# Patient Record
Sex: FEMALE | Race: WHITE | NOT HISPANIC OR LATINO | Employment: STUDENT
[De-identification: names, ages, dates, MRNs, and addresses within clinical notes are randomized per-mention and may not be internally consistent; named-entity substitution may affect disease eponyms.]

---
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
## 2023-01-07 NOTE — TELEPHONE ENCOUNTER
"  Admission Medication History     The home medication history was taken by Deanna Marie CPhT.    Medication history obtained from, [facility] Verified    You may go to "Admission" then "Reconcile Home Medications" tabs to review and/or act upon these items.     The home medication list has been updated by the Pharmacy department.   Please read ALL comments highlighted in yellow.   Please address this information as you see fit.    Feel free to contact us if you have any questions or require assistance.            Deanna Marie CPhT.  Ext [phone]             .        " Per pt's mother, states pt's brother was seen in urgent care in [place] and tested positive for influenza. Pt's mom drove with brother for 5 hours back home.  Family is going out of town this coming weekend and mom would like them all to be on preventati

## 2023-01-10 ENCOUNTER — APPOINTMENT (OUTPATIENT)
Dept: PEDIATRIC CARDIOLOGY | Age: 18
End: 2023-01-10
Attending: INTERNAL MEDICINE

## 2023-01-10 ENCOUNTER — APPOINTMENT (OUTPATIENT)
Dept: PEDIATRIC CARDIOLOGY | Age: 18
End: 2023-01-10

## 2023-05-10 ENCOUNTER — TELEPHONE (OUTPATIENT)
Dept: PEDIATRIC CARDIOLOGY | Age: 18
End: 2023-05-10

## 2023-07-14 ENCOUNTER — OFFICE VISIT (OUTPATIENT)
Dept: PEDIATRIC CARDIOLOGY | Age: 18
End: 2023-07-14

## 2023-07-14 ENCOUNTER — ANCILLARY PROCEDURE (OUTPATIENT)
Dept: PEDIATRIC CARDIOLOGY | Age: 18
End: 2023-07-14
Attending: INTERNAL MEDICINE

## 2023-07-14 VITALS
HEART RATE: 76 BPM | HEIGHT: 63 IN | DIASTOLIC BLOOD PRESSURE: 76 MMHG | WEIGHT: 119.05 LBS | OXYGEN SATURATION: 92 % | SYSTOLIC BLOOD PRESSURE: 126 MMHG | BODY MASS INDEX: 21.09 KG/M2

## 2023-07-14 DIAGNOSIS — Z98.890 STATUS POST FONTAN PROCEDURE: ICD-10-CM

## 2023-07-14 DIAGNOSIS — K76.9 LIVER DISEASE AFTER FONTAN PROCEDURE: ICD-10-CM

## 2023-07-14 DIAGNOSIS — Q22.4 TRICUSPID ATRESIA WITH NORMAL GREAT ARTERIES: Primary | ICD-10-CM

## 2023-07-14 DIAGNOSIS — Q22.4 TRICUSPID ATRESIA WITH NORMAL GREAT ARTERIES: ICD-10-CM

## 2023-07-14 DIAGNOSIS — T81.89XA LIVER DISEASE AFTER FONTAN PROCEDURE: ICD-10-CM

## 2023-07-14 DIAGNOSIS — Z71.87 COUNSELING FOR TRANSITION FROM PEDIATRIC TO ADULT MODEL OF CARE: ICD-10-CM

## 2023-07-14 LAB
BSA FOR PED ECHO PROCEDURE: 1.55 M2
EJECTION FRACTION: 57 %
FRACTIONAL SHORTENING: 38 %
LEFT VENTRICLE EJECTION FRACTION BY TEICHOLZ 2D (%): 66 %
LEFT VENTRICULAR POSTERIOR WALL IN END DIASTOLE (LVPW): 0.69 CM (ref 0.47–0.89)
LV SHORT-AXIS END-DIASTOLIC ENDOCARDIAL DIAMETER: 4.91 CM (ref 3.94–5.54)
LV SHORT-AXIS END-DIASTOLIC SEPTAL THICKNESS: 0.86 CM (ref 0.48–0.9)
LV SHORT-AXIS END-SYSTOLIC ENDOCARDIAL DIAMETER: 3.06 CM
LV THICKNESS:DIMENSION RATIO: 0.14 CM (ref 0.09–0.21)
Z SCORE OF LEFT VENTRICULAR POSTERIOR WALL IN END DIASTOLE: 0.1 CM
Z SCORE OF LV SHORT-AXIS END-DIASTOLIC ENDOCARDIAL DIAMETER: 0.4 CM
Z SCORE OF LV SHORT-AXIS END-DIASTOLIC SEPTAL THICKNESS: 1.6 CM
Z SCORE OF LV THICKNESS:DIMENSION RATIO: -0.3

## 2023-07-14 PROCEDURE — 99215 OFFICE O/P EST HI 40 MIN: CPT | Performed by: PEDIATRICS

## 2023-07-14 PROCEDURE — 93320 DOPPLER ECHO COMPLETE: CPT | Performed by: PEDIATRICS

## 2023-07-14 PROCEDURE — 93303 ECHO TRANSTHORACIC: CPT | Performed by: PEDIATRICS

## 2023-07-14 PROCEDURE — 93325 DOPPLER ECHO COLOR FLOW MAPG: CPT | Performed by: PEDIATRICS

## 2023-07-14 RX ORDER — SERTRALINE HYDROCHLORIDE 25 MG/1
25 TABLET, FILM COATED ORAL DAILY
COMMUNITY

## 2023-07-17 DIAGNOSIS — Q22.4 TRICUSPID ATRESIA WITH NORMAL GREAT ARTERIES: ICD-10-CM

## 2023-07-17 DIAGNOSIS — K76.9 LIVER DISEASE AFTER FONTAN PROCEDURE: ICD-10-CM

## 2023-07-17 DIAGNOSIS — T81.89XA LIVER DISEASE AFTER FONTAN PROCEDURE: ICD-10-CM

## 2023-07-17 DIAGNOSIS — Z98.890 STATUS POST FONTAN PROCEDURE: Primary | ICD-10-CM

## 2023-07-26 ENCOUNTER — NURSE TRIAGE (OUTPATIENT)
Dept: TELEHEALTH | Age: 18
End: 2023-07-26

## 2023-07-28 ENCOUNTER — E-ADVICE (OUTPATIENT)
Dept: PEDIATRIC CARDIOLOGY | Age: 18
End: 2023-07-28

## 2023-07-28 ENCOUNTER — TELEPHONE (OUTPATIENT)
Dept: CARDIOLOGY | Age: 18
End: 2023-07-28

## 2023-07-31 ENCOUNTER — TELEPHONE (OUTPATIENT)
Dept: CARDIOLOGY | Age: 18
End: 2023-07-31

## 2023-08-07 ENCOUNTER — HOSPITAL ENCOUNTER (OUTPATIENT)
Dept: PEDIATRIC CARDIOLOGY | Age: 18
Discharge: HOME | End: 2023-08-07
Attending: PEDIATRICS

## 2023-08-07 ENCOUNTER — HOSPITAL ENCOUNTER (OUTPATIENT)
Dept: CT IMAGING | Age: 18
Discharge: HOME | End: 2023-08-07
Attending: PEDIATRICS

## 2023-08-07 ENCOUNTER — LAB SERVICES (OUTPATIENT)
Dept: LAB | Age: 18
End: 2023-08-07

## 2023-08-07 DIAGNOSIS — Z98.890 STATUS POST FONTAN PROCEDURE: ICD-10-CM

## 2023-08-07 DIAGNOSIS — Q22.4 TRICUSPID ATRESIA WITH NORMAL GREAT ARTERIES: ICD-10-CM

## 2023-08-07 DIAGNOSIS — T81.89XA LIVER DISEASE AFTER FONTAN PROCEDURE: ICD-10-CM

## 2023-08-07 DIAGNOSIS — K76.9 LIVER DISEASE AFTER FONTAN PROCEDURE: ICD-10-CM

## 2023-08-07 LAB
25(OH)D3+25(OH)D2 SERPL-MCNC: 26.3 NG/ML (ref 30–100)
ALBUMIN SERPL-MCNC: 4.8 G/DL (ref 3.6–5.1)
ALBUMIN/GLOB SERPL: 1.3 {RATIO} (ref 1–2.4)
ALP SERPL-CCNC: 80 UNITS/L (ref 42–110)
ALT SERPL-CCNC: 32 UNITS/L
ANION GAP SERPL CALC-SCNC: 11 MMOL/L (ref 7–19)
AST SERPL-CCNC: 15 UNITS/L
BASOPHILS # BLD: 0 K/MCL (ref 0–0.3)
BASOPHILS NFR BLD: 0 %
BILIRUB SERPL-MCNC: 0.6 MG/DL (ref 0.2–1)
BUN SERPL-MCNC: 10 MG/DL (ref 6–20)
BUN/CREAT SERPL: 14 (ref 7–25)
CALCIUM SERPL-MCNC: 9.8 MG/DL (ref 8.4–10.2)
CHLORIDE SERPL-SCNC: 108 MMOL/L (ref 97–110)
CO2 SERPL-SCNC: 23 MMOL/L (ref 21–32)
CREAT SERPL-MCNC: 0.72 MG/DL (ref 0.51–0.95)
DEPRECATED RDW RBC: 42 FL (ref 39–50)
EOSINOPHIL # BLD: 0.1 K/MCL (ref 0–0.5)
EOSINOPHIL NFR BLD: 1 %
ERYTHROCYTE [DISTWIDTH] IN BLOOD: 12.2 % (ref 11–15)
FASTING DURATION TIME PATIENT: ABNORMAL H
FERRITIN SERPL-MCNC: 30 NG/ML (ref 8–252)
GFR SERPLBLD BASED ON 1.73 SQ M-ARVRAT: >90 ML/MIN
GGT SERPL-CCNC: 77 UNITS/L (ref 5–55)
GLOBULIN SER-MCNC: 3.6 G/DL (ref 2–4)
GLUCOSE SERPL-MCNC: 82 MG/DL (ref 70–99)
HBA1C MFR BLD: 5 % (ref 4.5–5.6)
HCT VFR BLD CALC: 43.9 % (ref 36–46.5)
HGB BLD-MCNC: 15.4 G/DL (ref 12–15.5)
IMM GRANULOCYTES # BLD AUTO: 0 K/MCL (ref 0–0.2)
IMM GRANULOCYTES # BLD: 0 %
INR PPP: 1.1
IRON SATN MFR SERPL: 12 % (ref 15–45)
IRON SERPL-MCNC: 48 MCG/DL (ref 50–170)
LYMPHOCYTES # BLD: 2 K/MCL (ref 1.2–5.2)
LYMPHOCYTES NFR BLD: 27 %
MAGNESIUM SERPL-MCNC: 2.2 MG/DL (ref 1.7–2.4)
MCH RBC QN AUTO: 32.6 PG (ref 26–34)
MCHC RBC AUTO-ENTMCNC: 35.1 G/DL (ref 32–36.5)
MCV RBC AUTO: 92.8 FL (ref 78–100)
MONOCYTES # BLD: 0.6 K/MCL (ref 0.3–0.9)
MONOCYTES NFR BLD: 8 %
NEUTROPHILS # BLD: 4.5 K/MCL (ref 1.8–8)
NEUTROPHILS NFR BLD: 64 %
NRBC BLD MANUAL-RTO: 0 /100 WBC
NT-PROBNP SERPL-MCNC: 102 PG/ML
PLATELET # BLD AUTO: 186 K/MCL (ref 140–450)
POTASSIUM SERPL-SCNC: 4.4 MMOL/L (ref 3.4–5.1)
PROT SERPL-MCNC: 8.4 G/DL (ref 6.4–8.2)
PROTHROMBIN TIME: 11.3 SEC (ref 9.7–11.8)
RBC # BLD: 4.73 MIL/MCL (ref 4–5.2)
SODIUM SERPL-SCNC: 138 MMOL/L (ref 135–145)
T3FREE SERPL-MCNC: 2.9 PG/ML (ref 2.9–4.7)
T4 FREE SERPL-MCNC: 1.1 NG/DL (ref 0.8–1.3)
TIBC SERPL-MCNC: 404 MCG/DL (ref 250–450)
TSH SERPL-ACNC: 2.76 MCUNITS/ML (ref 0.46–4.13)
WBC # BLD: 7.2 K/MCL (ref 4.2–11)

## 2023-08-07 PROCEDURE — 94621 CARDIOPULM EXERCISE TESTING: CPT

## 2023-08-07 PROCEDURE — 83540 ASSAY OF IRON: CPT | Performed by: INTERNAL MEDICINE

## 2023-08-07 PROCEDURE — 94621 CARDIOPULM EXERCISE TESTING: CPT | Performed by: PEDIATRICS

## 2023-08-07 PROCEDURE — 83880 ASSAY OF NATRIURETIC PEPTIDE: CPT | Performed by: INTERNAL MEDICINE

## 2023-08-07 PROCEDURE — G1004 CDSM NDSC: HCPCS

## 2023-08-07 PROCEDURE — 10002805 HB CONTRAST AGENT: Performed by: PEDIATRICS

## 2023-08-07 PROCEDURE — 82728 ASSAY OF FERRITIN: CPT | Performed by: INTERNAL MEDICINE

## 2023-08-07 PROCEDURE — 82306 VITAMIN D 25 HYDROXY: CPT | Performed by: INTERNAL MEDICINE

## 2023-08-07 PROCEDURE — 83036 HEMOGLOBIN GLYCOSYLATED A1C: CPT | Performed by: INTERNAL MEDICINE

## 2023-08-07 PROCEDURE — 82105 ALPHA-FETOPROTEIN SERUM: CPT | Performed by: INTERNAL MEDICINE

## 2023-08-07 PROCEDURE — 82977 ASSAY OF GGT: CPT | Performed by: INTERNAL MEDICINE

## 2023-08-07 PROCEDURE — 84439 ASSAY OF FREE THYROXINE: CPT | Performed by: INTERNAL MEDICINE

## 2023-08-07 PROCEDURE — 36415 COLL VENOUS BLD VENIPUNCTURE: CPT | Performed by: PEDIATRICS

## 2023-08-07 PROCEDURE — 80050 GENERAL HEALTH PANEL: CPT | Performed by: INTERNAL MEDICINE

## 2023-08-07 PROCEDURE — 84481 FREE ASSAY (FT-3): CPT | Performed by: INTERNAL MEDICINE

## 2023-08-07 PROCEDURE — 85610 PROTHROMBIN TIME: CPT | Performed by: INTERNAL MEDICINE

## 2023-08-07 PROCEDURE — 83735 ASSAY OF MAGNESIUM: CPT | Performed by: INTERNAL MEDICINE

## 2023-08-07 PROCEDURE — 83550 IRON BINDING TEST: CPT | Performed by: INTERNAL MEDICINE

## 2023-08-07 PROCEDURE — 74170 CT ABD WO CNTRST FLWD CNTRST: CPT

## 2023-08-07 RX ADMIN — IOHEXOL 75 ML: 350 INJECTION, SOLUTION INTRAVENOUS at 13:03

## 2023-08-08 LAB — AFP-TM SERPL-MCNC: <3 NG/ML

## 2023-08-18 LAB
PEAK HR PREDICTED: 201 BPM
STRESS TARGET HR: 201 BPM

## 2023-08-22 ENCOUNTER — E-ADVICE (OUTPATIENT)
Dept: PEDIATRIC CARDIOLOGY | Age: 18
End: 2023-08-22

## 2024-01-12 ENCOUNTER — APPOINTMENT (OUTPATIENT)
Dept: PEDIATRIC CARDIOLOGY | Age: 19
End: 2024-01-12

## 2024-01-12 VITALS
DIASTOLIC BLOOD PRESSURE: 56 MMHG | HEIGHT: 63 IN | WEIGHT: 115.41 LBS | HEART RATE: 58 BPM | OXYGEN SATURATION: 88 % | BODY MASS INDEX: 20.45 KG/M2 | SYSTOLIC BLOOD PRESSURE: 97 MMHG | TEMPERATURE: 98.4 F

## 2024-01-12 DIAGNOSIS — Z98.890 STATUS POST FONTAN PROCEDURE: Primary | ICD-10-CM

## 2024-01-12 DIAGNOSIS — Q22.4 TRICUSPID ATRESIA WITH NORMAL GREAT ARTERIES: ICD-10-CM

## 2024-01-12 PROCEDURE — 99215 OFFICE O/P EST HI 40 MIN: CPT | Performed by: PEDIATRICS

## 2024-01-12 RX ORDER — GUANFACINE 3 MG/1
3 TABLET, EXTENDED RELEASE ORAL DAILY
COMMUNITY
Start: 2023-12-19

## 2024-01-12 RX ORDER — SILDENAFIL CITRATE 20 MG/1
20 TABLET ORAL 2 TIMES DAILY
Qty: 90 TABLET | Refills: 3 | Status: SHIPPED | OUTPATIENT
Start: 2024-01-12

## 2024-03-07 ENCOUNTER — TELEPHONE (OUTPATIENT)
Dept: FAMILY MEDICINE CLINIC | Facility: CLINIC | Age: 19
End: 2024-03-07

## 2024-03-07 NOTE — TELEPHONE ENCOUNTER
Pt requesting refill on spironolactone 25 MG to [facility].  LOV 1/22.  Pt sched appt for next week.  Asking for refill now.  Pls advise

## 2024-03-07 NOTE — TELEPHONE ENCOUNTER
Pt last seen 1/4/22, per office notes:  Spironolactone, helped with skin in the past and wondering about restarting. Didn't add spironolactone as she runs low on BP,     Will await appt to restart med.

## 2024-03-12 ENCOUNTER — TELEMEDICINE (OUTPATIENT)
Dept: FAMILY MEDICINE CLINIC | Facility: CLINIC | Age: 19
End: 2024-03-12
Payer: COMMERCIAL

## 2024-03-12 DIAGNOSIS — L70.0 ACNE VULGARIS: Primary | ICD-10-CM

## 2024-03-12 PROCEDURE — 99213 OFFICE O/P EST LOW 20 MIN: CPT | Performed by: INTERNAL MEDICINE

## 2024-03-12 RX ORDER — FLUOXETINE HYDROCHLORIDE 40 MG/1
40 CAPSULE ORAL DAILY
COMMUNITY
Start: 2024-03-12

## 2024-03-12 RX ORDER — FLUOXETINE 10 MG/1
10 CAPSULE ORAL DAILY
COMMUNITY
Start: 2024-03-12

## 2024-03-12 RX ORDER — SILDENAFIL CITRATE 20 MG/1
20 TABLET ORAL 2 TIMES DAILY
COMMUNITY
Start: 2024-03-12

## 2024-03-12 RX ORDER — GUANFACINE 3 MG/1
3 TABLET, EXTENDED RELEASE ORAL DAILY
COMMUNITY
Start: 2022-01-29

## 2024-03-12 RX ORDER — SPIRONOLACTONE 25 MG/1
25 TABLET ORAL DAILY
Qty: 90 TABLET | Refills: 3 | Status: SHIPPED | OUTPATIENT
Start: 2024-03-12 | End: 2024-03-12 | Stop reason: CLARIF

## 2024-03-12 RX ORDER — SPIRONOLACTONE 50 MG/1
50 TABLET, FILM COATED ORAL DAILY
Qty: 90 TABLET | Refills: 3 | Status: SHIPPED | OUTPATIENT
Start: 2024-03-12

## 2024-03-12 NOTE — PROGRESS NOTES
Video Visit  Leslie Patel is a 18 year old female.  No chief complaint on file.        HPI:   Pt requests a video visit due to the Covid pandemic to discuss acne follow up.  Taking spironolactone for skin, seeing improvement. No SEs. Previously on 25mg and now on 50mg. SE maybe makes her a little tired.  Sees therapist and psychiatrist routinely. Sertraline added.    Sees Cardiology, sildenafil added BID. Baby ASA daily.        Current Outpatient Medications   Medication Sig Dispense Refill    guanFACINE HCl ER 3 MG Oral Tablet 24 Hr Take 1 tablet (3 mg total) by mouth daily.      spironolactone 25 MG Oral Tab Take 1 tablet (25 mg total) by mouth daily. 90 tablet 3    FLUoxetine 10 MG Oral Cap Take 1 capsule (10 mg total) by mouth daily. Take with fluoxetine 40 to equal 50mg daily      FLUoxetine HCl 40 MG Oral Cap Take 1 capsule (40 mg total) by mouth daily. Take with fluoxetine 10 to equal 50mg daily      sertraline 50 MG Oral Tab Take 1 tablet (50 mg total) by mouth daily.      sildenafil 20 MG Oral Tab Take 1 tablet (20 mg total) by mouth in the morning and 1 tablet (20 mg total) before bedtime. Rx'd by Cardiology.      aspirin 81 MG Oral Tab Take 81 mg by mouth daily.        Past Medical History:   Diagnosis Date    Strep pharyngitis 1/5/2013    Unspecified congenital anomaly of heart (HCC)     Ventricular septal defect (HCC)       Past Surgical History:   Procedure Laterality Date    Other surgical history  02/2004    heart surgery- 2004, superior vena cava    Other surgical history  06/2005    inferior vena cava      Social History:  Social History     Socioeconomic History    Marital status: Single   Tobacco Use    Smoking status: Never    Smokeless tobacco: Never   Substance and Sexual Activity    Alcohol use: No    Drug use: No   Other Topics Concern    Exercise Yes   Social History Narrative    ** Merged History Encounter **             REVIEW OF SYSTEMS:   GENERAL HEALTH: Denies fevers, chills or  myalgias  HEENT: Denies ear pain, nasal congestion, sinus pain, or sore throat  SKIN: Denies rash  LUNGS: Denies shortness of breath, wheezing, or cough  CV: Denies chest pain or palpitations; denies lightheadedness  GI: Denies abdominal pain, denies N/V/D  MS: Denies back, neck or joint pain  NEURO: Denies headaches  ALLERGY/ASTHMA: Denies asthma and environmental allergies       EXAM:   GENERAL: well developed, well nourished, NAD.  SKIN: No rash visible  HEENT: AT/NC. Normal lips, gums and teeth; no hyponasal tone on video visit  LUNGS: Speaking in complete sentences comfortably without increased work of breathing; no cough during visit  PSYCH: Normal mood and affect, A&Ox3, affect is consistent with mood      ASSESSMENT AND PLAN:   Diagnoses and all orders for this visit:    Acne vulgaris    Other orders  -     spironolactone 25 MG Oral Tab; Take 1 tablet (25 mg total) by mouth daily.            The patient indicates understanding of these issues and agrees to the plan.    Follow up 1 year    Duration of visit: 15 min      Leslie Patel understands a video evaluation is not a substitute for face-to-face examination or emergency care. Patient advised to go to ER or call 911 for worsening symptoms or acute distress.   Please note that the following visit was completed using two-way, real-time interactive video communication. This has been done in good [place] to provide continuity of care in the best interest of the provider-patient relationship, due to the ongoing public health crisis/national emergency and because of restrictions of visitation. There are limitations of this visit as limited physical exam could be performed. Every conscious effort was taken to allow for sufficient and adequate time. This billing was spent on reviewing labs, medications, radiology tests and decision making. Appropriate medical decision-making and tests are ordered as detailed in the plan of care above.

## 2024-04-02 ENCOUNTER — E-ADVICE (OUTPATIENT)
Dept: PEDIATRIC CARDIOLOGY | Age: 19
End: 2024-04-02

## 2024-05-21 ENCOUNTER — TELEPHONE (OUTPATIENT)
Dept: PEDIATRIC CARDIOLOGY | Age: 19
End: 2024-05-21

## 2024-05-21 DIAGNOSIS — I34.0 NONRHEUMATIC MITRAL VALVE REGURGITATION: ICD-10-CM

## 2024-05-21 DIAGNOSIS — Z98.890 STATUS POST FONTAN PROCEDURE: ICD-10-CM

## 2024-05-21 DIAGNOSIS — Q22.4 TRICUSPID ATRESIA WITH NORMAL GREAT ARTERIES (CMD): Primary | ICD-10-CM

## 2024-05-23 ENCOUNTER — LAB SERVICES (OUTPATIENT)
Dept: LAB | Age: 19
End: 2024-05-23

## 2024-05-23 ENCOUNTER — ANCILLARY PROCEDURE (OUTPATIENT)
Dept: PEDIATRIC CARDIOLOGY | Age: 19
End: 2024-05-23
Attending: PEDIATRICS

## 2024-05-23 ENCOUNTER — OFFICE VISIT (OUTPATIENT)
Dept: PEDIATRIC CARDIOLOGY | Age: 19
End: 2024-05-23

## 2024-05-23 VITALS
HEART RATE: 59 BPM | DIASTOLIC BLOOD PRESSURE: 56 MMHG | SYSTOLIC BLOOD PRESSURE: 111 MMHG | BODY MASS INDEX: 18.4 KG/M2 | OXYGEN SATURATION: 93 % | HEIGHT: 64 IN | WEIGHT: 107.81 LBS

## 2024-05-23 DIAGNOSIS — Q22.4 TRICUSPID ATRESIA WITH NORMAL GREAT ARTERIES (CMD): ICD-10-CM

## 2024-05-23 DIAGNOSIS — Z98.890 STATUS POST FONTAN PROCEDURE: ICD-10-CM

## 2024-05-23 DIAGNOSIS — Q22.4 TRICUSPID ATRESIA WITH NORMAL GREAT ARTERIES (CMD): Primary | ICD-10-CM

## 2024-05-23 DIAGNOSIS — I34.0 NONRHEUMATIC MITRAL VALVE REGURGITATION: ICD-10-CM

## 2024-05-23 LAB
ALBUMIN SERPL-MCNC: 5.2 G/DL (ref 3.6–5.1)
ALBUMIN/GLOB SERPL: 1.5 {RATIO} (ref 1–2.4)
ALP SERPL-CCNC: 63 UNITS/L (ref 45–117)
ALT SERPL-CCNC: 25 UNITS/L
ANION GAP SERPL CALC-SCNC: 9 MMOL/L (ref 7–19)
AST SERPL-CCNC: 12 UNITS/L
BASOPHILS # BLD: 0 K/MCL (ref 0–0.3)
BASOPHILS NFR BLD: 1 %
BILIRUB SERPL-MCNC: 1.1 MG/DL (ref 0.2–1)
BSA FOR PED ECHO PROCEDURE: 1.48 M2
BUN SERPL-MCNC: 10 MG/DL (ref 6–20)
BUN/CREAT SERPL: 13 (ref 7–25)
CALCIUM SERPL-MCNC: 10.3 MG/DL (ref 8.4–10.2)
CHLORIDE SERPL-SCNC: 110 MMOL/L (ref 97–110)
CO2 SERPL-SCNC: 23 MMOL/L (ref 21–32)
CREAT SERPL-MCNC: 0.76 MG/DL (ref 0.51–0.95)
DEPRECATED RDW RBC: 41.4 FL (ref 39–50)
EGFRCR SERPLBLD CKD-EPI 2021: >90 ML/MIN/{1.73_M2}
EOSINOPHIL # BLD: 0.1 K/MCL (ref 0–0.5)
EOSINOPHIL NFR BLD: 1 %
ERYTHROCYTE [DISTWIDTH] IN BLOOD: 11.9 % (ref 11–15)
FASTING DURATION TIME PATIENT: ABNORMAL H
FERRITIN SERPL-MCNC: 153 NG/ML (ref 8–252)
GGT SERPL-CCNC: 56 UNITS/L (ref 5–55)
GLOBULIN SER-MCNC: 3.4 G/DL (ref 2–4)
GLUCOSE SERPL-MCNC: 87 MG/DL (ref 70–99)
HCT VFR BLD CALC: 44.7 % (ref 36–46.5)
HGB BLD-MCNC: 15.6 G/DL (ref 12–15.5)
IMM GRANULOCYTES # BLD AUTO: 0 K/MCL (ref 0–0.2)
IMM GRANULOCYTES # BLD: 0 %
INR PPP: 1.2
IRON SATN MFR SERPL: 43 % (ref 15–45)
IRON SERPL-MCNC: 152 MCG/DL (ref 50–170)
LEFT VENTRICLE LONGITUDINAL STRAIN BY ENDOCARDIAL GLS A2C (%): -19 %
LEFT VENTRICLE LONGITUDINAL STRAIN BY ENDOCARDIAL GLS A3C (%): -18.2 %
LEFT VENTRICLE LONGITUDINAL STRAIN BY ENDOCARDIAL GLS A4C (%): -12.6 %
LEFT VENTRICLE LONGITUDINAL STRAIN BY ENDOCARDIAL GLS AVERAGE (%): -19.6 %
LYMPHOCYTES # BLD: 2.3 K/MCL (ref 1.2–5.2)
LYMPHOCYTES NFR BLD: 35 %
MAGNESIUM SERPL-MCNC: 2.2 MG/DL (ref 1.7–2.4)
MCH RBC QN AUTO: 33.5 PG (ref 26–34)
MCHC RBC AUTO-ENTMCNC: 34.9 G/DL (ref 32–36.5)
MCV RBC AUTO: 95.9 FL (ref 78–100)
MONOCYTES # BLD: 0.6 K/MCL (ref 0.3–0.9)
MONOCYTES NFR BLD: 9 %
NEUTROPHILS # BLD: 3.6 K/MCL (ref 1.8–8)
NEUTROPHILS NFR BLD: 54 %
NRBC BLD MANUAL-RTO: 0 /100 WBC
PA AA PRP-ACNC: 558 ARU
PLATELET # BLD AUTO: 157 K/MCL (ref 140–450)
POTASSIUM SERPL-SCNC: 3.8 MMOL/L (ref 3.4–5.1)
PROT SERPL-MCNC: 8.6 G/DL (ref 6.4–8.2)
PROTHROMBIN TIME: 12.4 SEC (ref 9.7–11.8)
RBC # BLD: 4.66 MIL/MCL (ref 4–5.2)
SODIUM SERPL-SCNC: 138 MMOL/L (ref 135–145)
T3FREE SERPL-MCNC: 3 PG/ML (ref 2.9–4.7)
T4 FREE SERPL-MCNC: 1.2 NG/DL (ref 0.8–1.3)
TIBC SERPL-MCNC: 355 MCG/DL (ref 250–450)
TSH SERPL-ACNC: 2.99 MCUNITS/ML (ref 0.46–4.13)
WBC # BLD: 6.6 K/MCL (ref 4.2–11)

## 2024-05-23 PROCEDURE — 83550 IRON BINDING TEST: CPT | Performed by: INTERNAL MEDICINE

## 2024-05-23 PROCEDURE — 93320 DOPPLER ECHO COMPLETE: CPT | Performed by: PEDIATRICS

## 2024-05-23 PROCEDURE — 82306 VITAMIN D 25 HYDROXY: CPT | Performed by: CLINICAL MEDICAL LABORATORY

## 2024-05-23 PROCEDURE — 85610 PROTHROMBIN TIME: CPT | Performed by: INTERNAL MEDICINE

## 2024-05-23 PROCEDURE — 82728 ASSAY OF FERRITIN: CPT | Performed by: INTERNAL MEDICINE

## 2024-05-23 PROCEDURE — 99215 OFFICE O/P EST HI 40 MIN: CPT | Performed by: PEDIATRICS

## 2024-05-23 PROCEDURE — 82105 ALPHA-FETOPROTEIN SERUM: CPT | Performed by: CLINICAL MEDICAL LABORATORY

## 2024-05-23 PROCEDURE — 93356 MYOCRD STRAIN IMG SPCKL TRCK: CPT | Performed by: PEDIATRICS

## 2024-05-23 PROCEDURE — 83540 ASSAY OF IRON: CPT | Performed by: INTERNAL MEDICINE

## 2024-05-23 PROCEDURE — 84481 FREE ASSAY (FT-3): CPT | Performed by: INTERNAL MEDICINE

## 2024-05-23 PROCEDURE — 36415 COLL VENOUS BLD VENIPUNCTURE: CPT | Performed by: PEDIATRICS

## 2024-05-23 PROCEDURE — 80050 GENERAL HEALTH PANEL: CPT | Performed by: INTERNAL MEDICINE

## 2024-05-23 PROCEDURE — 85576 BLOOD PLATELET AGGREGATION: CPT | Performed by: INTERNAL MEDICINE

## 2024-05-23 PROCEDURE — 82977 ASSAY OF GGT: CPT | Performed by: INTERNAL MEDICINE

## 2024-05-23 PROCEDURE — 84439 ASSAY OF FREE THYROXINE: CPT | Performed by: INTERNAL MEDICINE

## 2024-05-23 PROCEDURE — 83735 ASSAY OF MAGNESIUM: CPT | Performed by: INTERNAL MEDICINE

## 2024-05-23 PROCEDURE — 93303 ECHO TRANSTHORACIC: CPT | Performed by: PEDIATRICS

## 2024-05-23 PROCEDURE — 93325 DOPPLER ECHO COLOR FLOW MAPG: CPT | Performed by: PEDIATRICS

## 2024-05-24 ENCOUNTER — E-ADVICE (OUTPATIENT)
Dept: PEDIATRIC CARDIOLOGY | Age: 19
End: 2024-05-24

## 2024-05-24 LAB
25(OH)D3+25(OH)D2 SERPL-MCNC: 81.1 NG/ML (ref 30–100)
AFP-TM SERPL-MCNC: <3 NG/ML

## 2024-05-24 RX ORDER — ASPIRIN 325 MG
325 TABLET, DELAYED RELEASE (ENTERIC COATED) ORAL DAILY
Status: SHIPPED | COMMUNITY
Start: 2024-05-24

## 2024-06-14 PROCEDURE — 93244 EXT ECG>48HR<7D REV&INTERPJ: CPT | Performed by: PEDIATRICS

## 2024-06-20 ENCOUNTER — TELEPHONE (OUTPATIENT)
Dept: CARDIOLOGY | Age: 19
End: 2024-06-20

## 2024-06-28 ENCOUNTER — APPOINTMENT (OUTPATIENT)
Dept: PEDIATRIC CARDIOLOGY | Age: 19
End: 2024-06-28

## 2024-06-28 ENCOUNTER — APPOINTMENT (OUTPATIENT)
Dept: PEDIATRIC CARDIOLOGY | Age: 19
End: 2024-06-28
Attending: PEDIATRICS

## 2024-07-02 ENCOUNTER — TELEPHONE (OUTPATIENT)
Dept: PEDIATRIC CARDIOLOGY | Age: 19
End: 2024-07-02

## 2024-07-02 ENCOUNTER — E-ADVICE (OUTPATIENT)
Dept: PEDIATRIC CARDIOLOGY | Age: 19
End: 2024-07-02

## 2024-12-27 ENCOUNTER — APPOINTMENT (OUTPATIENT)
Dept: PEDIATRIC CARDIOLOGY | Age: 19
End: 2024-12-27

## 2024-12-27 ENCOUNTER — LAB SERVICES (OUTPATIENT)
Dept: LAB | Age: 19
End: 2024-12-27

## 2024-12-27 VITALS
BODY MASS INDEX: 21.27 KG/M2 | HEIGHT: 63 IN | HEART RATE: 88 BPM | SYSTOLIC BLOOD PRESSURE: 127 MMHG | OXYGEN SATURATION: 93 % | WEIGHT: 120.04 LBS | DIASTOLIC BLOOD PRESSURE: 86 MMHG

## 2024-12-27 DIAGNOSIS — Q22.4 TRICUSPID ATRESIA WITH NORMAL GREAT ARTERIES (CMD): ICD-10-CM

## 2024-12-27 DIAGNOSIS — Q22.4 TRICUSPID ATRESIA WITH NORMAL GREAT ARTERIES (CMD): Primary | ICD-10-CM

## 2024-12-27 DIAGNOSIS — Z87.74 STATUS POST FONTAN PROCEDURE: ICD-10-CM

## 2024-12-27 DIAGNOSIS — Z98.890 STATUS POST FONTAN PROCEDURE: ICD-10-CM

## 2024-12-27 LAB
AFP-TM SERPL-MCNC: <3 NG/ML
ALBUMIN SERPL-MCNC: 5.1 G/DL (ref 3.4–5)
ALBUMIN/GLOB SERPL: 1.5 {RATIO} (ref 1–2.4)
ALP SERPL-CCNC: 78 UNITS/L (ref 45–117)
ALT SERPL-CCNC: 18 UNITS/L
ANION GAP SERPL CALC-SCNC: 17 MMOL/L (ref 7–19)
AST SERPL-CCNC: 16 UNITS/L
BASOPHILS # BLD: 0 K/MCL (ref 0–0.3)
BASOPHILS NFR BLD: 0 %
BILIRUB SERPL-MCNC: 0.8 MG/DL (ref 0.2–1)
BUN SERPL-MCNC: 9 MG/DL (ref 6–20)
BUN/CREAT SERPL: 13 (ref 7–25)
CALCIUM SERPL-MCNC: 10.5 MG/DL (ref 8.4–10.2)
CHLORIDE SERPL-SCNC: 104 MMOL/L (ref 97–110)
CHOLEST SERPL-MCNC: 108 MG/DL
CHOLEST/HDLC SERPL: 2.3 {RATIO}
CO2 SERPL-SCNC: 25 MMOL/L (ref 21–32)
CREAT SERPL-MCNC: 0.69 MG/DL (ref 0.51–0.95)
CYSTATIN C SERPL-MCNC: 0.65 MG/L (ref 0.5–1)
DEPRECATED RDW RBC: 41.1 FL (ref 39–50)
EGFRCR SERPLBLD CKD-EPI 2021: >90 ML/MIN/{1.73_M2}
EGFRCR-CYS SERPLBLD CKD-EPI 2021: >90 M/L/MIN/1.73M2
EOSINOPHIL # BLD: 0.1 K/MCL (ref 0–0.5)
EOSINOPHIL NFR BLD: 1 %
ERYTHROCYTE [DISTWIDTH] IN BLOOD: 11.9 % (ref 11–15)
FASTING DURATION TIME PATIENT: ABNORMAL H
FERRITIN SERPL-MCNC: 182 NG/ML (ref 8–252)
GGT SERPL-CCNC: 69 UNITS/L (ref 5–55)
GLOBULIN SER-MCNC: 3.4 G/DL (ref 2–4)
GLUCOSE SERPL-MCNC: 94 MG/DL (ref 70–99)
HBA1C MFR BLD: 4.9 % (ref 4.5–5.6)
HCT VFR BLD CALC: 46.5 % (ref 36–46.5)
HDLC SERPL-MCNC: 47 MG/DL
HGB BLD-MCNC: 16.4 G/DL (ref 12–15.5)
IMM GRANULOCYTES # BLD AUTO: 0 K/MCL (ref 0–0.2)
IMM GRANULOCYTES # BLD: 0 %
INR PPP: 1.1
IRON SATN MFR SERPL: 20 % (ref 15–45)
IRON SERPL-MCNC: 68 MCG/DL (ref 50–170)
LDLC SERPL CALC-MCNC: 52 MG/DL
LYMPHOCYTES # BLD: 2 K/MCL (ref 1–4.8)
LYMPHOCYTES NFR BLD: 22 %
MAGNESIUM SERPL-MCNC: 2.2 MG/DL (ref 1.7–2.4)
MCH RBC QN AUTO: 33.5 PG (ref 26–34)
MCHC RBC AUTO-ENTMCNC: 35.3 G/DL (ref 32–36.5)
MCV RBC AUTO: 94.9 FL (ref 78–100)
MONOCYTES # BLD: 0.6 K/MCL (ref 0.3–0.9)
MONOCYTES NFR BLD: 7 %
NEUTROPHILS # BLD: 6.4 K/MCL (ref 1.8–7.7)
NEUTROPHILS NFR BLD: 70 %
NONHDLC SERPL-MCNC: 61 MG/DL
NRBC BLD MANUAL-RTO: 0 /100 WBC
NT-PROBNP SERPL-MCNC: 58 PG/ML
PLATELET # BLD AUTO: 189 K/MCL (ref 140–450)
POTASSIUM SERPL-SCNC: 4.4 MMOL/L (ref 3.4–5.1)
PROT SERPL-MCNC: 8.5 G/DL (ref 6.4–8.2)
PROTHROMBIN TIME: 11.4 SEC (ref 9.7–11.8)
RBC # BLD: 4.9 MIL/MCL (ref 4–5.2)
SODIUM SERPL-SCNC: 142 MMOL/L (ref 135–145)
T3FREE SERPL-MCNC: 2.7 PG/ML (ref 2.2–4)
T4 FREE SERPL-MCNC: 1 NG/DL (ref 0.8–1.5)
TIBC SERPL-MCNC: 346 MCG/DL (ref 250–450)
TRIGL SERPL-MCNC: 43 MG/DL
TSH SERPL-ACNC: 2.84 MCUNITS/ML (ref 0.35–5)
WBC # BLD: 9.3 K/MCL (ref 4.2–11)

## 2024-12-27 PROCEDURE — 80050 GENERAL HEALTH PANEL: CPT | Performed by: INTERNAL MEDICINE

## 2024-12-27 PROCEDURE — 82610 CYSTATIN C: CPT | Performed by: CLINICAL MEDICAL LABORATORY

## 2024-12-27 PROCEDURE — 83735 ASSAY OF MAGNESIUM: CPT | Performed by: INTERNAL MEDICINE

## 2024-12-27 PROCEDURE — 83540 ASSAY OF IRON: CPT | Performed by: INTERNAL MEDICINE

## 2024-12-27 PROCEDURE — 82977 ASSAY OF GGT: CPT | Performed by: INTERNAL MEDICINE

## 2024-12-27 PROCEDURE — 82728 ASSAY OF FERRITIN: CPT | Performed by: INTERNAL MEDICINE

## 2024-12-27 PROCEDURE — 36415 COLL VENOUS BLD VENIPUNCTURE: CPT | Performed by: PEDIATRICS

## 2024-12-27 PROCEDURE — 83880 ASSAY OF NATRIURETIC PEPTIDE: CPT | Performed by: INTERNAL MEDICINE

## 2024-12-27 PROCEDURE — 85610 PROTHROMBIN TIME: CPT | Performed by: INTERNAL MEDICINE

## 2024-12-27 PROCEDURE — 84439 ASSAY OF FREE THYROXINE: CPT | Performed by: INTERNAL MEDICINE

## 2024-12-27 PROCEDURE — 83550 IRON BINDING TEST: CPT | Performed by: INTERNAL MEDICINE

## 2024-12-27 PROCEDURE — 84481 FREE ASSAY (FT-3): CPT | Performed by: INTERNAL MEDICINE

## 2024-12-27 PROCEDURE — 83036 HEMOGLOBIN GLYCOSYLATED A1C: CPT | Performed by: INTERNAL MEDICINE

## 2024-12-27 PROCEDURE — 80061 LIPID PANEL: CPT | Performed by: INTERNAL MEDICINE

## 2024-12-27 PROCEDURE — 82105 ALPHA-FETOPROTEIN SERUM: CPT | Performed by: CLINICAL MEDICAL LABORATORY

## 2024-12-27 RX ORDER — ATOMOXETINE 40 MG/1
40 CAPSULE ORAL DAILY
COMMUNITY
Start: 2024-12-05

## 2024-12-31 ENCOUNTER — TELEPHONE (OUTPATIENT)
Dept: PEDIATRIC CARDIOLOGY | Age: 19
End: 2024-12-31

## 2024-12-31 DIAGNOSIS — T81.89XA LIVER DISEASE AFTER FONTAN PROCEDURE: Primary | ICD-10-CM

## 2024-12-31 DIAGNOSIS — K76.9 LIVER DISEASE AFTER FONTAN PROCEDURE: Primary | ICD-10-CM

## 2024-12-31 DIAGNOSIS — Z98.890 STATUS POST FONTAN PROCEDURE: ICD-10-CM

## 2024-12-31 RX ORDER — SILDENAFIL CITRATE 20 MG/1
20 TABLET ORAL 2 TIMES DAILY
Qty: 60 TABLET | Refills: 11 | Status: SHIPPED | OUTPATIENT
Start: 2024-12-31

## 2025-01-10 ENCOUNTER — E-ADVICE (OUTPATIENT)
Dept: PEDIATRIC CARDIOLOGY | Age: 20
End: 2025-01-10

## 2025-02-03 ENCOUNTER — E-ADVICE (OUTPATIENT)
Dept: PEDIATRIC CARDIOLOGY | Age: 20
End: 2025-02-03

## 2025-04-09 RX ORDER — SPIRONOLACTONE 50 MG/1
50 TABLET, FILM COATED ORAL DAILY
Qty: 90 TABLET | Refills: 3 | OUTPATIENT
Start: 2025-04-09

## 2025-05-14 ENCOUNTER — APPOINTMENT (OUTPATIENT)
Dept: MRI IMAGING | Age: 20
End: 2025-05-14
Attending: PEDIATRICS

## 2025-05-14 ENCOUNTER — E-ADVICE (OUTPATIENT)
Dept: PEDIATRIC CARDIOLOGY | Age: 20
End: 2025-05-14

## 2025-05-17 ENCOUNTER — TELEPHONE (OUTPATIENT)
Dept: CARDIOLOGY | Age: 20
End: 2025-05-17

## 2025-05-19 ENCOUNTER — HOSPITAL ENCOUNTER (OUTPATIENT)
Dept: MRI IMAGING | Age: 20
Discharge: HOME | End: 2025-05-19
Attending: PEDIATRICS

## 2025-05-19 DIAGNOSIS — Z87.74 STATUS POST FONTAN PROCEDURE: ICD-10-CM

## 2025-05-19 DIAGNOSIS — Q22.4 TRICUSPID ATRESIA WITH NORMAL GREAT ARTERIES (CMD): ICD-10-CM

## 2025-05-19 PROCEDURE — 10002805 HB CONTRAST AGENT: Performed by: PEDIATRICS

## 2025-05-19 PROCEDURE — A9577 INJ MULTIHANCE: HCPCS | Performed by: PEDIATRICS

## 2025-05-19 PROCEDURE — 76391 MR ELASTOGRAPHY: CPT

## 2025-05-19 RX ADMIN — GADOBENATE DIMEGLUMINE 12 ML: 529 INJECTION, SOLUTION INTRAVENOUS at 11:51

## 2025-05-20 ENCOUNTER — HOSPITAL ENCOUNTER (OUTPATIENT)
Dept: CARDIOLOGY | Age: 20
Discharge: HOME | End: 2025-05-20
Attending: PEDIATRICS

## 2025-05-20 VITALS
SYSTOLIC BLOOD PRESSURE: 130 MMHG | DIASTOLIC BLOOD PRESSURE: 85 MMHG | BODY MASS INDEX: 22.09 KG/M2 | HEART RATE: 108 BPM | WEIGHT: 124.34 LBS

## 2025-05-20 DIAGNOSIS — Q22.4 TRICUSPID ATRESIA WITH NORMAL GREAT ARTERIES (CMD): ICD-10-CM

## 2025-05-20 DIAGNOSIS — Z87.74 STATUS POST FONTAN PROCEDURE: ICD-10-CM

## 2025-05-20 PROCEDURE — 94621 CARDIOPULM EXERCISE TESTING: CPT

## 2025-05-20 PROCEDURE — 94621 CARDIOPULM EXERCISE TESTING: CPT | Performed by: PEDIATRICS

## 2025-05-22 LAB — PEAK HR PREDICTED: 199 BPM

## 2025-06-03 ENCOUNTER — E-ADVICE (OUTPATIENT)
Dept: PEDIATRIC CARDIOLOGY | Age: 20
End: 2025-06-03

## 2025-06-24 ENCOUNTER — TELEPHONE (OUTPATIENT)
Dept: PEDIATRIC CARDIOLOGY | Age: 20
End: 2025-06-24

## 2025-08-22 ENCOUNTER — APPOINTMENT (OUTPATIENT)
Dept: PEDIATRIC CARDIOLOGY | Age: 20
End: 2025-08-22
Attending: PEDIATRICS

## 2025-08-22 ENCOUNTER — APPOINTMENT (OUTPATIENT)
Dept: PEDIATRIC CARDIOLOGY | Age: 20
End: 2025-08-22

## 2025-08-22 VITALS
HEART RATE: 86 BPM | OXYGEN SATURATION: 96 % | BODY MASS INDEX: 22.27 KG/M2 | HEIGHT: 63 IN | WEIGHT: 125.66 LBS | SYSTOLIC BLOOD PRESSURE: 118 MMHG | DIASTOLIC BLOOD PRESSURE: 63 MMHG | RESPIRATION RATE: 17 BRPM

## 2025-08-22 DIAGNOSIS — Q22.4 TRICUSPID ATRESIA WITH NORMAL GREAT ARTERIES (CMD): Primary | ICD-10-CM

## 2025-08-22 DIAGNOSIS — Z87.74 STATUS POST FONTAN PROCEDURE: ICD-10-CM

## 2025-08-22 DIAGNOSIS — Q22.4 TRICUSPID ATRESIA WITH NORMAL GREAT ARTERIES (CMD): ICD-10-CM

## 2025-08-22 LAB
AORTIC ROOT: 2.62 CM (ref 2.16–3.06)
AORTIC VALVE ANNULUS: 1.73 CM (ref 1.52–2.22)
ASCENDING AORTA: 2.14 CM (ref 1.81–2.71)
BSA FOR PED ECHO PROCEDURE: 1.6 M2
FRACTIONAL SHORTENING: 33 %
LEFT VENTRICLE EJECTION FRACTION BY TEICHOLZ 2D (%): 61 %
LEFT VENTRICULAR POSTERIOR WALL IN END DIASTOLE (LVPW): 0.7 CM (ref 0.48–0.9)
LV EF BIPLANE MOD: 66 %
LV EF US.2D.A2C+ESTIMATED: 79 %
LV EF US.2D.A4C+ESTIMATED: 51 %
LV SHORT-AXIS END-DIASTOLIC ENDOCARDIAL DIAMETER: 5.28 CM (ref 4–5.62)
LV SHORT-AXIS END-DIASTOLIC SEPTAL THICKNESS: 0.7 CM (ref 0.49–0.91)
LV SHORT-AXIS END-SYSTOLIC ENDOCARDIAL DIAMETER: 3.55 CM
LV THICKNESS:DIMENSION RATIO: 0.13 CM (ref 0.09–0.21)
SINOTUBULAR JUNCTION: 2.46 CM (ref 1.74–2.54)
Z SCORE OF AORTIC VALVE ANNULUS PHN: -0.8 CM
Z SCORE OF LEFT VENTRICULAR POSTERIOR WALL IN END DIASTOLE: 0.1 CM
Z SCORE OF LV SHORT-AXIS END-DIASTOLIC ENDOCARDIAL DIAMETER: 1.2 CM
Z SCORE OF LV SHORT-AXIS END-DIASTOLIC SEPTAL THICKNESS: 0 CM
Z SCORE OF LV THICKNESS:DIMENSION RATIO: -0.6
Z-SCORE OF AORTIC ROOT: 0.1 CM
Z-SCORE OF ASCENDING AORTA: -0.5 CM
Z-SCORE OF SINOTUBULAR JUNCTION PHN: 1.6 CM

## 2025-08-22 PROCEDURE — 93325 DOPPLER ECHO COLOR FLOW MAPG: CPT | Performed by: PEDIATRICS

## 2025-08-22 PROCEDURE — 93303 ECHO TRANSTHORACIC: CPT | Performed by: PEDIATRICS

## 2025-08-22 PROCEDURE — 93356 MYOCRD STRAIN IMG SPCKL TRCK: CPT | Performed by: PEDIATRICS

## 2025-08-22 PROCEDURE — 93320 DOPPLER ECHO COMPLETE: CPT | Performed by: PEDIATRICS

## 2025-08-26 LAB
AORTIC ROOT: 2.62 CM (ref 2.16–3.06)
AORTIC VALVE ANNULUS: 1.73 CM (ref 1.52–2.22)
ASCENDING AORTA: 2.14 CM (ref 1.81–2.71)
BSA FOR PED ECHO PROCEDURE: 1.6 M2
FRACTIONAL SHORTENING: 33 %
LEFT VENTRICLE EJECTION FRACTION BY TEICHOLZ 2D (%): 61 %
LEFT VENTRICULAR POSTERIOR WALL IN END DIASTOLE (LVPW): 0.7 CM (ref 0.48–0.9)
LV EF BIPLANE MOD: 66 %
LV EF US.2D.A2C+ESTIMATED: 79 %
LV EF US.2D.A4C+ESTIMATED: 51 %
LV SHORT-AXIS END-DIASTOLIC ENDOCARDIAL DIAMETER: 5.28 CM (ref 4–5.62)
LV SHORT-AXIS END-DIASTOLIC SEPTAL THICKNESS: 0.7 CM (ref 0.49–0.91)
LV SHORT-AXIS END-SYSTOLIC ENDOCARDIAL DIAMETER: 3.55 CM
LV THICKNESS:DIMENSION RATIO: 0.13 CM (ref 0.09–0.21)
SINOTUBULAR JUNCTION: 2.46 CM (ref 1.74–2.54)
Z SCORE OF AORTIC VALVE ANNULUS PHN: -0.8 CM
Z SCORE OF LEFT VENTRICULAR POSTERIOR WALL IN END DIASTOLE: 0.1 CM
Z SCORE OF LV SHORT-AXIS END-DIASTOLIC ENDOCARDIAL DIAMETER: 1.2 CM
Z SCORE OF LV SHORT-AXIS END-DIASTOLIC SEPTAL THICKNESS: 0 CM
Z SCORE OF LV THICKNESS:DIMENSION RATIO: -0.6
Z-SCORE OF AORTIC ROOT: 0.1 CM
Z-SCORE OF ASCENDING AORTA: -0.5 CM
Z-SCORE OF SINOTUBULAR JUNCTION PHN: 1.6 CM

## (undated) DEVICE — Device

## (undated) DEVICE — KIT RM TURNOVER CSTM IC DISP NS LF

## (undated) DEVICE — SYRINGE 20ML GRAD STRL MED DISP LL

## (undated) DEVICE — CATHETER IV 16GA 1.16IN SHLD NOTCH NDL PSHBTN DEHP-FR BD

## (undated) DEVICE — TOWEL OR BLU 16X26IN 4PK

## (undated) DEVICE — TUBING SCT 12FR BARON FRZR VENT OBT FLAKE RST SHAFT STRL

## (undated) DEVICE — SOLUTION IRR 1000ML 0.9% NACL PLASTIC POUR BTL ISTNC N-PYRG

## (undated) DEVICE — SUTURE MTPS 4-0 PS2 18IN CR MONO ABS BRN 1637G

## (undated) DEVICE — SYRINGE 10ML CNTRL CONC TIP PYROGEN FREE DEHP-FR STRL MED LF

## (undated) DEVICE — TUBING SCT CLR 12FT 316IN MDVC MAXI-GRIP NCDTV MALE TO MALE

## (undated) DEVICE — GLOVE SURG 6.5 BIOGEL SRG LTX STRAW PF BEAD CUFF MIC ROUGHEN

## (undated) NOTE — LETTER
[facility] [employer] of Child Health Examination       Student's Name  Leslie Patel Birth Date Signature                                                                                                                                   Title                           Date     Signature Grade Level/ID#  9th Grade   HEALTH HISTORY          TO BE COMPLETED AND SIGNED BY PARENT/GUARDIAN AND VERIFIED BY HEALTH CARE PROVIDER    ALLERGIES  (Food, drug, insect, other)  Review of patient's allergies indicates no known allergies.  MEDICATION  [facility] Date     PHYSICAL EXAMINATION REQUIREMENTS    Entire section below to be completed by MD//APN/PA       PHYSICAL EXAMINATION REQUIREMENTS (head circumference if <33 years old):   /66 (BP Location: Left arm, Ears Yes                      Screen result: Gastrointestinal Yes    Eyes Yes     Screen result:   Genito-Urinary Yes  LMP   Nose Yes  Neurological Yes    Throat Yes  Musculoskeletal Yes    Mouth/Dental Yes  Spinal examination Yes    Cardiovascular/HTN Yes Rev 11/15                                                                    Printed by the [employer]